# Patient Record
Sex: FEMALE | Race: OTHER | URBAN - METROPOLITAN AREA
[De-identification: names, ages, dates, MRNs, and addresses within clinical notes are randomized per-mention and may not be internally consistent; named-entity substitution may affect disease eponyms.]

---
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
## 2024-04-29 ENCOUNTER — LAB ENCOUNTER (OUTPATIENT)
Dept: LAB | Facility: HOSPITAL | Age: 19
End: 2024-04-29
Attending: FAMILY MEDICINE
Payer: MEDICAID

## 2024-04-29 ENCOUNTER — TELEPHONE (OUTPATIENT)
Dept: INTERNAL MEDICINE CLINIC | Facility: CLINIC | Age: 19
End: 2024-04-29

## 2024-04-29 DIAGNOSIS — Z02.1 ENCOUNTER FOR PRE-EMPLOYMENT EXAMINATION: Primary | ICD-10-CM

## 2024-04-29 DIAGNOSIS — Z11.1 SCREENING FOR TUBERCULOSIS: ICD-10-CM

## 2024-04-29 DIAGNOSIS — R74.01 ELEVATED AST (SGOT): ICD-10-CM

## 2024-04-29 LAB
ALBUMIN SERPL-MCNC: 4.5 G/DL (ref 3.2–4.8)
ALP LIVER SERPL-CCNC: 123 U/L
ALT SERPL-CCNC: 10 U/L
AST SERPL-CCNC: 17 U/L (ref ?–34)
BILIRUB DIRECT SERPL-MCNC: 0.2 MG/DL (ref ?–0.3)
BILIRUB SERPL-MCNC: 0.6 MG/DL (ref 0.3–1.2)
PROT SERPL-MCNC: 7.7 G/DL (ref 5.7–8.2)

## 2024-04-29 PROCEDURE — 36415 COLL VENOUS BLD VENIPUNCTURE: CPT

## 2024-04-29 PROCEDURE — 86480 TB TEST CELL IMMUN MEASURE: CPT

## 2024-04-29 PROCEDURE — 80076 HEPATIC FUNCTION PANEL: CPT

## 2024-04-29 NOTE — TELEPHONE ENCOUNTER
Patient mother called and says daughter Danyelle is starting work at  and needs a TB shot as soon as possible. She starts today but they are giving her time to get the vaccine.

## 2024-04-29 NOTE — TELEPHONE ENCOUNTER
LVM to patient that labs are ordered.   Advise her to get it done at the main hospital.   ALVA BRAVO

## 2024-04-30 LAB
M TB IFN-G CD4+ T-CELLS BLD-ACNC: 0.03 IU/ML
M TB TUBERC IFN-G BLD QL: NEGATIVE
M TB TUBERC IGNF/MITOGEN IGNF CONTROL: >10 IU/ML
QFT TB1 AG MINUS NIL: 0.02 IU/ML
QFT TB2 AG MINUS NIL: 0.02 IU/ML

## 2024-09-25 ENCOUNTER — PATIENT MESSAGE (OUTPATIENT)
Dept: INTERNAL MEDICINE CLINIC | Facility: CLINIC | Age: 19
End: 2024-09-25

## 2024-09-26 NOTE — TELEPHONE ENCOUNTER
Immunization Summary printed and awaiting response from Danyelle if she will pick it up or she prefers our office to mail it.

## 2024-09-26 NOTE — TELEPHONE ENCOUNTER
From: Danyelle Bang  To: Ilsa Reed  Sent: 9/25/2024 11:25 AM CDT  Subject: Shot record needed for school    Hello, I wanted to see if I can get a copy of my shot record. My school needs a copy of it.     Looking forward to your response. Have a great day!

## 2024-10-22 ENCOUNTER — OFFICE VISIT (OUTPATIENT)
Dept: INTERNAL MEDICINE CLINIC | Facility: CLINIC | Age: 19
End: 2024-10-22
Payer: MEDICAID

## 2024-10-22 VITALS
OXYGEN SATURATION: 99 % | WEIGHT: 180 LBS | TEMPERATURE: 98 F | HEART RATE: 92 BPM | SYSTOLIC BLOOD PRESSURE: 112 MMHG | BODY MASS INDEX: 30.36 KG/M2 | HEIGHT: 64.57 IN | DIASTOLIC BLOOD PRESSURE: 76 MMHG

## 2024-10-22 DIAGNOSIS — Z00.00 WELLNESS EXAMINATION: Primary | ICD-10-CM

## 2024-10-22 PROCEDURE — 99395 PREV VISIT EST AGE 18-39: CPT | Performed by: FAMILY MEDICINE

## 2024-10-22 NOTE — PROGRESS NOTES
CC: Annual Physical Exam    HPI:   Danyelle Bang is a 19 year old female who presents for a complete physical exam.    HCM  -Diet:  Well-balanced.   -Exercise  ctive  -Mental Health: denies any depression or anxiety sx  -Skin care:  no concerning lesions/moles  -Menses: regular cycles. No heavy bleeding or cramping    Working hard on lifestyle changs . Has lost 90lbs thus far    Wt Readings from Last 6 Encounters:   10/22/24 180 lb (81.6 kg)   08/23/23 269 lb (122 kg) (>99%, Z= 2.64)*   12/16/22 246 lb (111.6 kg) (>99%, Z= 2.44)*   06/01/22 254 lb (115.2 kg) (>99%, Z= 2.48)*     * Growth percentiles are based on CDC (Girls, 2-20 Years) data.     Body mass index is 30.36 kg/m².     Results for orders placed or performed in visit on 04/29/24   Hepatic Function Panel (7) [E]    Collection Time: 04/29/24 11:26 AM   Result Value Ref Range    AST 17 <=34 U/L    ALT 10 10 - 49 U/L    Alkaline Phosphatase 123 52 - 144 U/L    Bilirubin, Total 0.6 0.3 - 1.2 mg/dL    Bilirubin, Direct 0.2 <=0.3 mg/dL    Total Protein 7.7 5.7 - 8.2 g/dL    Albumin 4.5 3.2 - 4.8 g/dL   Quantiferon TB Plus    Collection Time: 04/29/24 11:26 AM   Result Value Ref Range    Quantiferon TB NIL 0.03 IU/mL    Quantiferon TB1 minus NIL 0.02 IU/mL    Quantiferon TB2 minus NIL 0.02 IU/mL    Quantiferon TB Mitogen minus NIL >10.00 IU/mL    Quantiferon TB Result Negative Negative       No current outpatient medications on file.      Allergies[1]   No past medical history on file.   No past surgical history on file.   Family History   Problem Relation Age of Onset    Diabetes Father     Diabetes Mother     Diabetes Maternal Grandmother       Social History:   Social History     Socioeconomic History    Marital status: Single   Tobacco Use    Smoking status: Never    Smokeless tobacco: Never   Substance and Sexual Activity    Alcohol use: Never          REVIEW OF SYSTEMS:   Review of Systems   Constitutional:  Negative for fatigue and fever.    Respiratory:  Negative for cough and shortness of breath.    Cardiovascular:  Negative for chest pain, palpitations and leg swelling.   Gastrointestinal:  Negative for abdominal pain, constipation, diarrhea and vomiting.   Musculoskeletal:  Negative for arthralgias.   Neurological:  Negative for headaches.            PHYSICAL EXAM:   /76   Pulse 92   Temp 97.8 °F (36.6 °C)   Ht 5' 4.57\" (1.64 m)   Wt 180 lb (81.6 kg)   LMP 10/07/2024 (Exact Date)   SpO2 99%   BMI 30.36 kg/m²  Estimated body mass index is 30.36 kg/m² as calculated from the following:    Height as of this encounter: 5' 4.57\" (1.64 m).    Weight as of this encounter: 180 lb (81.6 kg).     Wt Readings from Last 3 Encounters:   10/22/24 180 lb (81.6 kg)   08/23/23 269 lb (122 kg) (>99%, Z= 2.64)*   12/16/22 246 lb (111.6 kg) (>99%, Z= 2.44)*     * Growth percentiles are based on CDC (Girls, 2-20 Years) data.       Physical Exam  Vitals reviewed.   Constitutional:       General: She is not in acute distress.     Appearance: She is well-developed.   HENT:      Head: Normocephalic.      Right Ear: Tympanic membrane and external ear normal.      Left Ear: Tympanic membrane and external ear normal.   Eyes:      Conjunctiva/sclera: Conjunctivae normal.      Pupils: Pupils are equal, round, and reactive to light.   Neck:      Thyroid: No thyromegaly.   Cardiovascular:      Rate and Rhythm: Normal rate and regular rhythm.      Heart sounds: Normal heart sounds. No murmur heard.  Pulmonary:      Effort: Pulmonary effort is normal. No respiratory distress.      Breath sounds: Normal breath sounds.   Abdominal:      General: Bowel sounds are normal. There is no distension.      Palpations: Abdomen is soft.      Tenderness: There is no abdominal tenderness.   Musculoskeletal:         General: Normal range of motion.      Cervical back: Normal range of motion and neck supple.      Right lower leg: No edema.      Left lower leg: No edema.   Skin:      Findings: No rash.   Neurological:      General: No focal deficit present.      Cranial Nerves: No cranial nerve deficit.           ASSESSMENT AND PLAN:   Danyelle Bang is a 19 year old female who presents for a complete physical exam.      Health maintenance, will check :   Orders Placed This Encounter   Procedures    CBC With Differential With Platelet    Comp Metabolic Panel (14)    Hemoglobin A1C    Lipid Panel    TSH W Reflex To Free T4       Diagnoses and all orders for this visit:    Wellness examination  -     CBC With Differential With Platelet  -     Comp Metabolic Panel (14); Future  -     Hemoglobin A1C; Future  -     Lipid Panel; Future  -     TSH W Reflex To Free T4; Future       -     Pt reassured and all questions answered.  -     Age/sex specific preventive measures/immunizations reviewed and discussed with pt.  -     Pt counseled with regards to diet and exercise.      Health Maintenance Due   Topic Date Due    Annual Physical  08/23/2024    COVID-19 Vaccine (4 - 2023-24 season) 09/01/2024         The patient indicates understanding of these issues and agrees to the plan.  Return if symptoms worsen or fail to improve.  Reasurrance and education provided. All questions answered. Red flags/ ER precautions discussed.       [1]   Allergies  Allergen Reactions    Z-Yoseph [Azithromycin] HIVES

## 2024-12-17 ENCOUNTER — PATIENT MESSAGE (OUTPATIENT)
Dept: INTERNAL MEDICINE CLINIC | Facility: CLINIC | Age: 19
End: 2024-12-17

## 2024-12-24 NOTE — TELEPHONE ENCOUNTER
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  Message routed to Dr Gomez who is OOO until 12/30.  [employer] message sent to pt acknowledging receipt of her request and routing of it to PCP.

## 2025-08-20 ENCOUNTER — OFFICE VISIT (OUTPATIENT)
Dept: OBGYN CLINIC | Facility: CLINIC | Age: 20
End: 2025-08-20

## 2025-08-20 VITALS
HEIGHT: 64 IN | WEIGHT: 293 LBS | HEART RATE: 89 BPM | SYSTOLIC BLOOD PRESSURE: 126 MMHG | DIASTOLIC BLOOD PRESSURE: 81 MMHG | BODY MASS INDEX: 50.02 KG/M2

## 2025-08-20 DIAGNOSIS — E66.01 MORBID OBESITY WITH BMI OF 50.0-59.9, ADULT (HCC): ICD-10-CM

## 2025-08-20 DIAGNOSIS — L68.0 HIRSUTISM: Primary | ICD-10-CM

## 2025-08-20 PROCEDURE — 99204 OFFICE O/P NEW MOD 45 MIN: CPT | Performed by: STUDENT IN AN ORGANIZED HEALTH CARE EDUCATION/TRAINING PROGRAM

## 2025-08-20 RX ORDER — DROSPIRENONE AND ESTETROL 3-14.2(28)
1 KIT ORAL DAILY
Qty: 84 TABLET | Refills: 4 | Status: SHIPPED | OUTPATIENT
Start: 2025-08-20 | End: 2025-09-19

## 2025-08-21 ENCOUNTER — LAB ENCOUNTER (OUTPATIENT)
Dept: LAB | Age: 20
End: 2025-08-21
Attending: STUDENT IN AN ORGANIZED HEALTH CARE EDUCATION/TRAINING PROGRAM

## 2025-08-21 DIAGNOSIS — L68.0 HIRSUTISM: ICD-10-CM

## 2025-08-21 LAB
CHOLEST SERPL-MCNC: 161 MG/DL (ref ?–200)
DEPRECATED HBV CORE AB SER IA-ACNC: 12 NG/ML (ref 50–306)
DHEA-S SERPL-MCNC: 203.1 UG/DL (ref 25.9–460.2)
EST. AVERAGE GLUCOSE BLD GHB EST-MCNC: 94 MG/DL (ref 68–126)
ESTRADIOL SERPL-MCNC: 131.1 PG/ML
FASTING PATIENT GLUCOSE ANSWER: YES
FASTING PATIENT LIPID ANSWER: YES
FSH SERPL-ACNC: 4.7 MIU/ML
GLUCOSE BLD-MCNC: 88 MG/DL (ref 70–99)
HBA1C MFR BLD: 4.9 % (ref ?–5.7)
HDLC SERPL-MCNC: 43 MG/DL (ref 40–59)
INSULIN SERPL-ACNC: 16.1 MU/L (ref 3–25)
LDLC SERPL CALC-MCNC: 101 MG/DL (ref ?–100)
LH SERPL-ACNC: 4.9 MIU/ML
NONHDLC SERPL-MCNC: 118 MG/DL (ref ?–130)
PROLACTIN SERPL-MCNC: 16.4 NG/ML
TRIGL SERPL-MCNC: 91 MG/DL (ref 30–149)
TSI SER-ACNC: 2.37 UIU/ML (ref 0.55–4.78)
VLDLC SERPL CALC-MCNC: 15 MG/DL (ref 0–30)

## 2025-08-21 PROCEDURE — 82947 ASSAY GLUCOSE BLOOD QUANT: CPT

## 2025-08-21 PROCEDURE — 82670 ASSAY OF TOTAL ESTRADIOL: CPT

## 2025-08-21 PROCEDURE — 36415 COLL VENOUS BLD VENIPUNCTURE: CPT

## 2025-08-21 PROCEDURE — 83036 HEMOGLOBIN GLYCOSYLATED A1C: CPT

## 2025-08-21 PROCEDURE — 82728 ASSAY OF FERRITIN: CPT

## 2025-08-21 PROCEDURE — 83002 ASSAY OF GONADOTROPIN (LH): CPT

## 2025-08-21 PROCEDURE — 84410 TESTOSTERONE BIOAVAILABLE: CPT

## 2025-08-21 PROCEDURE — 83001 ASSAY OF GONADOTROPIN (FSH): CPT

## 2025-08-21 PROCEDURE — 80061 LIPID PANEL: CPT

## 2025-08-21 PROCEDURE — 84146 ASSAY OF PROLACTIN: CPT

## 2025-08-21 PROCEDURE — 84443 ASSAY THYROID STIM HORMONE: CPT

## 2025-08-21 PROCEDURE — 83525 ASSAY OF INSULIN: CPT

## 2025-08-21 PROCEDURE — 82627 DEHYDROEPIANDROSTERONE: CPT

## 2025-08-27 LAB
SEX HORM BIND GLOB: 28.7 NMOL/L
TESTOST % FREE+WEAK BND: 25.1 %
TESTOST FREE+WEAK BND: 8.5 NG/DL
TESTOSTERONE TOT /MS: 33.8 NG/DL